# Patient Record
Sex: FEMALE | Race: WHITE | URBAN - METROPOLITAN AREA
[De-identification: names, ages, dates, MRNs, and addresses within clinical notes are randomized per-mention and may not be internally consistent; named-entity substitution may affect disease eponyms.]

---

## 2023-10-25 LAB
AMB EXT BILIRUBIN, TOTAL: 0.4 MG/DL
AMB EXT BUN: 15 MG/DL
AMB EXT CALCIUM: 9.3
AMB EXT CHLORIDE: 103
AMB EXT CHOLESTEROL, TOTAL: 182 MG/DL
AMB EXT CMP ALT: 38 U/L
AMB EXT CMP AST: 29 U/L
AMB EXT CREATININE: 0.83 MG/DL
AMB EXT EGFR NON-AA: 98
AMB EXT GLUCOSE: 92 MG/DL
AMB EXT HDL CHOLESTEROL: 50 MG/DL
AMB EXT HEMATOCRIT: 37.8
AMB EXT HEMOGLOBIN: 12.3
AMB EXT LDL CHOLESTEROL, DIRECT: 112 MG/DL
AMB EXT MCV: 89
AMB EXT PLATELETS: 275
AMB EXT POSTASSIUM: 4.7 MMOL/L
AMB EXT SODIUM: 140 MMOL/L
AMB EXT TOTAL PROTEIN: 7.1
AMB EXT TRIGLYCERIDES: 112 MG/DL
AMB EXT WBC: 5.3 X10(3)UL

## 2023-11-13 ENCOUNTER — OFFICE VISIT (OUTPATIENT)
Dept: INTERNAL MEDICINE CLINIC | Facility: CLINIC | Age: 28
End: 2023-11-13
Payer: COMMERCIAL

## 2023-11-13 VITALS
OXYGEN SATURATION: 98 % | WEIGHT: 169 LBS | TEMPERATURE: 98 F | SYSTOLIC BLOOD PRESSURE: 122 MMHG | HEART RATE: 62 BPM | RESPIRATION RATE: 16 BRPM | BODY MASS INDEX: 26.84 KG/M2 | DIASTOLIC BLOOD PRESSURE: 80 MMHG | HEIGHT: 66.54 IN

## 2023-11-13 DIAGNOSIS — Z00.00 ROUTINE PHYSICAL EXAMINATION: Primary | ICD-10-CM

## 2023-11-13 DIAGNOSIS — Z30.41 ENCOUNTER FOR SURVEILLANCE OF CONTRACEPTIVE PILLS: ICD-10-CM

## 2023-11-13 DIAGNOSIS — R74.01 ELEVATED ALT MEASUREMENT: ICD-10-CM

## 2023-11-13 RX ORDER — LEVONORGESTREL AND ETHINYL ESTRADIOL 0.1-0.02MG
1 KIT ORAL DAILY
COMMUNITY
End: 2023-11-13

## 2023-11-13 RX ORDER — LEVONORGESTREL AND ETHINYL ESTRADIOL 0.1-0.02MG
KIT ORAL
COMMUNITY
Start: 2023-10-25 | End: 2023-11-13 | Stop reason: ALTCHOICE

## 2023-11-13 RX ORDER — LEVONORGESTREL AND ETHINYL ESTRADIOL 0.1-0.02MG
1 KIT ORAL DAILY
Qty: 84 TABLET | Refills: 3 | Status: SHIPPED | OUTPATIENT
Start: 2023-11-13

## 2024-10-05 ENCOUNTER — PATIENT MESSAGE (OUTPATIENT)
Dept: INTERNAL MEDICINE CLINIC | Facility: CLINIC | Age: 29
End: 2024-10-05

## 2024-10-05 RX ORDER — LEVONORGESTREL AND ETHINYL ESTRADIOL 0.1-0.02MG
1 KIT ORAL DAILY
Qty: 84 TABLET | Refills: 0 | Status: SHIPPED | OUTPATIENT
Start: 2024-10-05

## 2024-10-05 RX ORDER — LEVONORGESTREL/ETHIN.ESTRADIOL 0.1-0.02MG
1 TABLET ORAL DAILY
Qty: 84 TABLET | Refills: 0 | OUTPATIENT
Start: 2024-10-05

## 2024-10-05 NOTE — TELEPHONE ENCOUNTER
Last time medication was refilled: 11/13/23  Next office visit due/scheduled: no apt  Last office visit: 11/13/23  Last Labs: 10/25/23

## 2024-10-07 NOTE — TELEPHONE ENCOUNTER
Last time medication was refilled 10/5/24  Last office visit  11/13/23  Next office visit due/scheduled due on 11/13/25, message sent

## 2024-10-07 NOTE — TELEPHONE ENCOUNTER
From: Mary Fernandez  To: Milena Frye  Sent: 10/5/2024 10:36 AM CDT  Subject: Prescription Refill    Hello!    I was hoping to get a refill of my prescription for Aubra EQ. I have had no side effects and am on my last pack. I won't be able to get in until my physical until mid-November to hit the year jude, but don't want to have a gap in between the end of this pack and my physical.    Please let me know if you need anything else from me to assist in this.    Thank you!

## 2024-11-18 ENCOUNTER — OFFICE VISIT (OUTPATIENT)
Dept: INTERNAL MEDICINE CLINIC | Facility: CLINIC | Age: 29
End: 2024-11-18
Payer: COMMERCIAL

## 2024-11-18 VITALS
RESPIRATION RATE: 16 BRPM | WEIGHT: 164.38 LBS | SYSTOLIC BLOOD PRESSURE: 120 MMHG | DIASTOLIC BLOOD PRESSURE: 82 MMHG | HEART RATE: 78 BPM | HEIGHT: 66.54 IN | TEMPERATURE: 97 F | OXYGEN SATURATION: 99 % | BODY MASS INDEX: 26.11 KG/M2

## 2024-11-18 DIAGNOSIS — Z00.00 ROUTINE PHYSICAL EXAMINATION: Primary | ICD-10-CM

## 2024-11-18 DIAGNOSIS — Z30.41 ENCOUNTER FOR SURVEILLANCE OF CONTRACEPTIVE PILLS: ICD-10-CM

## 2024-11-18 RX ORDER — LEVONORGESTREL/ETHIN.ESTRADIOL 0.1-0.02MG
1 TABLET ORAL DAILY
Qty: 84 TABLET | Refills: 3 | Status: SHIPPED | OUTPATIENT
Start: 2024-11-18

## 2024-11-19 NOTE — PROGRESS NOTES
Wellness Exam    CC: Patient is presenting for a wellness exam    HPI:   Concerns: lost about 10 lbs for wedding, having trouble keeping it off, gained about 6-7 lbs back  Exercise has been regular, 30 minutes 5-6 days/week, c/o fatigue in the afternoons  Diet is balanced, exercise: running, biking, swimming, some weights.    Gyne:     Health Maintenance   Topic Date Due    Pap Smear  Never done         Denies pelvic pain, abnormal discharge or genital lesions.    Breast Cancer Screening:  No recommendations at this time   Regular self breast exam: y.      Pertinent Family History:   Family History   Problem Relation Age of Onset    Hypertension Mother     Cancer Father         Prostate    Heart Disorder Father         Heart Attack (2022)    Hypertension Father     Heart Disorder Maternal Grandfather     Cancer Maternal Grandmother         Liver    Cancer Paternal Grandfather         Prostate      History reviewed. No pertinent past medical history.  History reviewed. No pertinent surgical history.  Social History     Socioeconomic History    Marital status: Single   Tobacco Use    Smoking status: Never    Smokeless tobacco: Never   Vaping Use    Vaping status: Never Used   Substance and Sexual Activity    Alcohol use: Yes     Comment: a few drinks a week    Drug use: Never    Sexual activity: Yes     Partners: Male     Birth control/protection: Pill, Condom     Medications Ordered Prior to Encounter[1]  Tobacco:  She has never smoked tobacco.       Review of Systems   Constitutional: Negative for fever, chills and fatigue.   HENT: Negative for hearing loss, congestion, sore throat and neck pain.    Eyes: Negative for pain and visual disturbance.   Respiratory: Negative for cough and shortness of breath.    Cardiovascular: Negative for chest pain and palpitations.   Gastrointestinal: Negative for nausea, vomiting, abdominal pain and diarrhea.   Genitourinary: Negative for urgency, frequency of urination, and  abnormal vaginal bleeding.   Musculoskeletal: Negative for arthralgias and gait problem.   Skin: Negative for color change and rash.   Neurological: Negative for tremors, weakness and numbness.   Hematological: Negative for adenopathy. Does not bruise/bleed easily.   Psychiatric/Behavioral: Negative for confusion and agitation. The patient is not nervous/anxious.      /82   Pulse 78   Temp 97 °F (36.1 °C) (Temporal)   Resp 16   Ht 5' 6.54\" (1.69 m)   Wt 164 lb 6.4 oz (74.6 kg)   LMP 10/25/2024   SpO2 99%   BMI 26.11 kg/m²   Physical Exam   Constitutional: She is oriented to person, place, and time. She appears well-developed. No distress.    Head: Normocephalic and atraumatic.   Eyes: EOM are normal. Pupils are equal, round, and reactive to light. No scleral icterus.   ENT: TM's clear, nose normal, no oropharyngeal exudates or tonsillar hypertrophy    Neck: Normal range of motion. No thyromegaly present.   Cardiovascular: Normal rate, regular rhythm and normal heart sounds.  No murmur or friction rub heard.  Pulmonary/Chest: Effort normal and breath sounds normal bilaterally. She has no wheezes or rales.   Abdominal: Soft. Bowel sounds are normal. There is no tenderness. No HSM.  Musculoskeletal: Normal range of motion. She exhibits no edema.   Lymphadenopathy: She has no cervical, supraclavicular, or axillary adenopathy.   Neurological: She is alert and oriented to person, place, and time. DTRs are +2 and symmetric. Cranial nerves grossly intact.  Skin: Skin is warm. No rash noted. No erythema, pallor or jaundice.   Psychiatric: She has a normal mood and affect and her behavior is normal.     Assessment and Plan:  Mary Fernandez is a 29 year old female here for a wellness exam.  Age appropriate cancer screening, labs, safety, immunizations were discussed with the patient and ordered as follows:  1. Routine physical examination (Primary)  2. Encounter for surveillance of contraceptive pills  -      Levonorgestrel-Ethinyl Estrad; Take 1 tablet by mouth daily.  Dispense: 84 tablet; Refill: 3  Screening labs reviewed with patient, see scan. No concerning findings. Continue healthy lifestyle.      Discussed use of sunscreen, wearing seatbelt, recommend regular cardiovascular and weight bearing exercise as well as a well-rounded diet.    Return in about 1 year (around 11/18/2025) for routine physical, or sooner as needed.     Patient/Caregiver Education:  Patient/Caregiver Education: There are no barriers to learning. Medical education done.  Outcome: Patient verbalizes understanding.       Educated by: KIRSTIE         [1]   Current Outpatient Medications on File Prior to Visit   Medication Sig Dispense Refill    [DISCONTINUED] AUBRA EQ 0.1-20 MG-MCG Oral Tab TAKE ONE TABLET BY MOUTH ONE TIME DAILY 84 tablet 0     No current facility-administered medications on file prior to visit.

## 2025-05-28 ENCOUNTER — OFFICE VISIT (OUTPATIENT)
Dept: FAMILY MEDICINE CLINIC | Facility: CLINIC | Age: 30
End: 2025-05-28
Payer: COMMERCIAL

## 2025-05-28 VITALS
HEIGHT: 67 IN | BODY MASS INDEX: 26.58 KG/M2 | OXYGEN SATURATION: 98 % | TEMPERATURE: 98 F | DIASTOLIC BLOOD PRESSURE: 74 MMHG | HEART RATE: 70 BPM | RESPIRATION RATE: 16 BRPM | WEIGHT: 169.38 LBS | SYSTOLIC BLOOD PRESSURE: 122 MMHG

## 2025-05-28 DIAGNOSIS — J00 ACUTE RHINITIS: Primary | ICD-10-CM

## 2025-05-28 DIAGNOSIS — H69.93 DYSFUNCTION OF BOTH EUSTACHIAN TUBES: ICD-10-CM

## 2025-05-28 PROCEDURE — 3008F BODY MASS INDEX DOCD: CPT | Performed by: NURSE PRACTITIONER

## 2025-05-28 PROCEDURE — 99213 OFFICE O/P EST LOW 20 MIN: CPT | Performed by: NURSE PRACTITIONER

## 2025-05-28 PROCEDURE — 3074F SYST BP LT 130 MM HG: CPT | Performed by: NURSE PRACTITIONER

## 2025-05-28 PROCEDURE — 3078F DIAST BP <80 MM HG: CPT | Performed by: NURSE PRACTITIONER

## 2025-05-28 NOTE — PROGRESS NOTES
CHIEF COMPLAINT:     Chief Complaint   Patient presents with    Sinus Problem     Nasal congestion and cough since Monday and pain in both ears since yesterday       HPI:   Mary Fernandez is a 30 year old female who presents to clinic today with complaints of nasal/sinus symptoms.  Onset 2 days.  Reports nasal congestion, ear pressure/ear fullness, clear nasal discharge, decreased smell/taste this morning.  Denies fever, sore throat, body aches, chills, dyspnea, sob, wheezing, chest pain, GI complaints, or rashes.  No known ill contacts.  Doesn't usually get allergies but wonders if it has been allergies.  Taking zyrtec and flonase the past day.    Current Medications[1]   Past Medical History[2]   Social History:  Short Social Hx on File[3]     REVIEW OF SYSTEMS:   GENERAL: Feeling well otherwise.    SKIN: no unusual skin lesions or rashes  HEENT: See HPI  LUNGS: No cough, shortness of breath, or wheezing.  CARDIOVASCULAR: No chest pain, palpitations  GI: No N/V/C/D.  NEURO: denies headaches or dizziness    EXAM:   /74   Pulse 70   Temp 98 °F (36.7 °C) (Oral)   Resp 16   Ht 5' 7\" (1.702 m)   Wt 169 lb 6.4 oz (76.8 kg)   LMP 05/14/2025 (Approximate)   SpO2 98%   BMI 26.53 kg/m²   GENERAL: well developed, well nourished, and in no apparent distress  SKIN: no rashes, no suspicious lesions  HEAD: atraumatic, normocephalic  EYES: conjunctiva clear, EOM intact  EARS: Tragus non tender on palpation bilaterally. External auditory canals healthy. Right TM: Normal, no bulging, no retraction,+small fluid, bony landmarks visible.  Left TM: Normal, no bulging, no retraction,+small fluid, bony landmarks visible.  Bilat mastoid and pre-auricular areas normal.  NOSE: nostrils patent, +clear exudates, nasal mucosa pink and noninflamed  THROAT: oral mucosa pink, moist. Posterior pharynx is not erythematous or injected. No exudates.  NECK: supple, non-tender  LUNGS: clear to auscultation bilaterally, no wheezes or  rhonchi. Breathing is non labored. No cough during time of visit.  CARDIO: RRR without murmur  LYMPH: No lymphadenopathy.      ASSESSMENT AND PLAN:   Mary Fernandez is a 30 year old female who presents with:    ASSESSMENT:  Encounter Diagnoses   Name Primary?    Acute rhinitis Yes    Dysfunction of both eustachian tubes        PLAN:   - Symptoms c/w allergies vs mild virus.  - Continue flonase, zyrtec.  Consider addition of sudafed.  - Comfort measures as described in Patient Instructions including tylenol/motrin prn pain.  - Advised F/U visit if no improvement/worsening/new symptoms such as fever or ear drainage within 5-7 days.  - Patient verbalizes understanding and is agreeable w/ plan.    Meds & Refills for this Visit:  Requested Prescriptions      No prescriptions requested or ordered in this encounter         Risk and benefits of medication discussed. Pt verbalizes understanding.    There are no Patient Instructions on file for this visit.                 [1]   Current Outpatient Medications   Medication Sig Dispense Refill    Levonorgestrel-Ethinyl Estrad (AUBRA EQ) 0.1-20 MG-MCG Oral Tab Take 1 tablet by mouth daily. 84 tablet 3   [2] History reviewed. No pertinent past medical history.  [3]   Social History  Socioeconomic History    Marital status: Single   Tobacco Use    Smoking status: Never    Smokeless tobacco: Never   Vaping Use    Vaping status: Never Used   Substance and Sexual Activity    Alcohol use: Yes     Comment: a few drinks a week    Drug use: Never    Sexual activity: Yes     Partners: Male     Birth control/protection: Pill, Condom     Social Drivers of Health     Food Insecurity: No Food Insecurity (12/6/2022)    Received from [facility]    Hunger Vital Sign     Worried About Running Out of Food in the Last Year: Never true     Ran Out of Food in the Last Year: Never true   Transportation Needs: No Transportation Needs (12/6/2022)    Received from [facility]     PRAPARE - Transportation     Lack of Transportation (Medical): No     Lack of Transportation (Non-Medical): No   Housing Stability: Low Risk  (12/6/2022)    Received from [facility]    Housing Stability Vital Sign     Unable to Pay for Housing in the Last Year: No     Number of Places Lived in the Last Year: 1     Unstable Housing in the Last Year: No